# Patient Record
Sex: MALE | Race: BLACK OR AFRICAN AMERICAN | NOT HISPANIC OR LATINO | Employment: OTHER | URBAN - METROPOLITAN AREA
[De-identification: names, ages, dates, MRNs, and addresses within clinical notes are randomized per-mention and may not be internally consistent; named-entity substitution may affect disease eponyms.]

---

## 2019-03-12 ENCOUNTER — OFFICE VISIT (OUTPATIENT)
Dept: FAMILY MEDICINE | Facility: CLINIC | Age: 19
End: 2019-03-12
Payer: COMMERCIAL

## 2019-03-12 ENCOUNTER — LAB VISIT (OUTPATIENT)
Dept: LAB | Facility: HOSPITAL | Age: 19
End: 2019-03-12
Payer: COMMERCIAL

## 2019-03-12 VITALS
HEART RATE: 117 BPM | OXYGEN SATURATION: 94 % | WEIGHT: 136.56 LBS | HEIGHT: 71 IN | DIASTOLIC BLOOD PRESSURE: 85 MMHG | SYSTOLIC BLOOD PRESSURE: 121 MMHG | TEMPERATURE: 98 F | BODY MASS INDEX: 19.12 KG/M2

## 2019-03-12 DIAGNOSIS — R80.9 PROTEINURIA, UNSPECIFIED TYPE: ICD-10-CM

## 2019-03-12 DIAGNOSIS — Z00.00 ROUTINE ADULT HEALTH MAINTENANCE: Primary | ICD-10-CM

## 2019-03-12 DIAGNOSIS — Z00.00 ROUTINE ADULT HEALTH MAINTENANCE: ICD-10-CM

## 2019-03-12 LAB
ALBUMIN SERPL BCP-MCNC: 4.9 G/DL
ALP SERPL-CCNC: 80 U/L
ALT SERPL W/O P-5'-P-CCNC: 14 U/L
ANION GAP SERPL CALC-SCNC: 12 MMOL/L
AST SERPL-CCNC: 28 U/L
BASOPHILS # BLD AUTO: 0.02 K/UL
BASOPHILS NFR BLD: 0.3 %
BILIRUB SERPL-MCNC: 2.1 MG/DL
BUN SERPL-MCNC: 15 MG/DL
CALCIUM SERPL-MCNC: 10.6 MG/DL
CHLORIDE SERPL-SCNC: 103 MMOL/L
CO2 SERPL-SCNC: 24 MMOL/L
CREAT SERPL-MCNC: 1 MG/DL
DIFFERENTIAL METHOD: NORMAL
EOSINOPHIL # BLD AUTO: 0.3 K/UL
EOSINOPHIL NFR BLD: 3.5 %
ERYTHROCYTE [DISTWIDTH] IN BLOOD BY AUTOMATED COUNT: 12.6 %
EST. GFR  (AFRICAN AMERICAN): >60 ML/MIN/1.73 M^2
EST. GFR  (NON AFRICAN AMERICAN): >60 ML/MIN/1.73 M^2
ESTIMATED AVG GLUCOSE: 100 MG/DL
GLUCOSE SERPL-MCNC: 72 MG/DL
HBA1C MFR BLD HPLC: 5.1 %
HCT VFR BLD AUTO: 44.7 %
HGB BLD-MCNC: 15.1 G/DL
IMM GRANULOCYTES # BLD AUTO: 0.01 K/UL
IMM GRANULOCYTES NFR BLD AUTO: 0.1 %
LYMPHOCYTES # BLD AUTO: 2.4 K/UL
LYMPHOCYTES NFR BLD: 33.4 %
MCH RBC QN AUTO: 30.4 PG
MCHC RBC AUTO-ENTMCNC: 33.8 G/DL
MCV RBC AUTO: 90 FL
MONOCYTES # BLD AUTO: 0.5 K/UL
MONOCYTES NFR BLD: 6.7 %
NEUTROPHILS # BLD AUTO: 4 K/UL
NEUTROPHILS NFR BLD: 56 %
NRBC BLD-RTO: 0 /100 WBC
PLATELET # BLD AUTO: 278 K/UL
PMV BLD AUTO: 10 FL
POTASSIUM SERPL-SCNC: 3.6 MMOL/L
PROT SERPL-MCNC: 8.7 G/DL
RBC # BLD AUTO: 4.97 M/UL
SODIUM SERPL-SCNC: 139 MMOL/L
TSH SERPL DL<=0.005 MIU/L-ACNC: 0.88 UIU/ML
WBC # BLD AUTO: 7.12 K/UL

## 2019-03-12 PROCEDURE — 36415 COLL VENOUS BLD VENIPUNCTURE: CPT | Mod: PO

## 2019-03-12 PROCEDURE — 83036 HEMOGLOBIN GLYCOSYLATED A1C: CPT

## 2019-03-12 PROCEDURE — 99385 PREV VISIT NEW AGE 18-39: CPT | Mod: S$GLB,,, | Performed by: INTERNAL MEDICINE

## 2019-03-12 PROCEDURE — 99385 PR PREVENTIVE VISIT,NEW,18-39: ICD-10-PCS | Mod: S$GLB,,, | Performed by: INTERNAL MEDICINE

## 2019-03-12 PROCEDURE — 84443 ASSAY THYROID STIM HORMONE: CPT

## 2019-03-12 PROCEDURE — 99999 PR PBB SHADOW E&M-NEW PATIENT-LVL III: ICD-10-PCS | Mod: PBBFAC,,, | Performed by: INTERNAL MEDICINE

## 2019-03-12 PROCEDURE — 99999 PR PBB SHADOW E&M-NEW PATIENT-LVL III: CPT | Mod: PBBFAC,,, | Performed by: INTERNAL MEDICINE

## 2019-03-12 PROCEDURE — 86038 ANTINUCLEAR ANTIBODIES: CPT

## 2019-03-12 PROCEDURE — 85025 COMPLETE CBC W/AUTO DIFF WBC: CPT

## 2019-03-12 PROCEDURE — 86431 RHEUMATOID FACTOR QUANT: CPT

## 2019-03-12 PROCEDURE — 80053 COMPREHEN METABOLIC PANEL: CPT

## 2019-03-12 NOTE — PROGRESS NOTES
Subjective:       Patient ID: Corinjmichael Henderson X is a 18 y.o. male.    Chief Complaint: Establish Care    -est care------------------had urine drug screen for work and found to have protein in urine and told to f/u with doctor------------he has no known medical conditions and no new symptoms today-----      No past medical history on file.  No past surgical history on file.  Family History   Problem Relation Age of Onset    Hypertension Mother      Social History     Socioeconomic History    Marital status: Single     Spouse name: Not on file    Number of children: Not on file    Years of education: Not on file    Highest education level: Not on file   Social Needs    Financial resource strain: Not on file    Food insecurity - worry: Not on file    Food insecurity - inability: Not on file    Transportation needs - medical: Not on file    Transportation needs - non-medical: Not on file   Occupational History    Not on file   Tobacco Use    Smoking status: Current Some Day Smoker     Years: 0.50   Substance and Sexual Activity    Alcohol use: Yes     Alcohol/week: 1.2 oz     Types: 2 Shots of liquor per week     Frequency: Monthly or less    Drug use: Yes     Types: Marijuana     Comment: every day     Sexual activity: Not on file   Other Topics Concern    Not on file   Social History Narrative    Not on file     Review of Systems   Constitutional: Negative for activity change, appetite change, chills, diaphoresis, fatigue, fever and unexpected weight change.   HENT: Negative for drooling, ear discharge, ear pain, facial swelling, hearing loss, mouth sores, nosebleeds, postnasal drip, rhinorrhea, sinus pressure, sneezing, sore throat, tinnitus, trouble swallowing and voice change.    Eyes: Negative for photophobia, redness and visual disturbance.   Respiratory: Negative for apnea, cough, choking, chest tightness, shortness of breath and wheezing.    Cardiovascular: Negative for chest pain, palpitations  and leg swelling.   Gastrointestinal: Negative for abdominal distention, abdominal pain, anal bleeding, blood in stool, constipation, diarrhea, nausea and vomiting.   Endocrine: Negative for cold intolerance, heat intolerance, polydipsia, polyphagia and polyuria.   Genitourinary: Negative for difficulty urinating, dysuria, enuresis, flank pain, frequency, genital sores, hematuria and urgency.   Musculoskeletal: Negative for arthralgias, back pain, gait problem, joint swelling, myalgias, neck pain and neck stiffness.   Skin: Negative for color change, pallor, rash and wound.   Allergic/Immunologic: Negative for food allergies and immunocompromised state.   Neurological: Negative for dizziness, tremors, seizures, syncope, facial asymmetry, speech difficulty, weakness, light-headedness, numbness and headaches.   Hematological: Negative for adenopathy. Does not bruise/bleed easily.   Psychiatric/Behavioral: Negative for agitation, behavioral problems, confusion, decreased concentration, dysphoric mood, hallucinations, self-injury, sleep disturbance and suicidal ideas. The patient is not nervous/anxious and is not hyperactive.        Objective:      Physical Exam   Constitutional: He is oriented to person, place, and time. He appears well-developed and well-nourished. No distress.   HENT:   Head: Normocephalic and atraumatic.   Eyes: Pupils are equal, round, and reactive to light. No scleral icterus.   Neck: Normal range of motion. Neck supple. No JVD present. Carotid bruit is not present. No tracheal deviation present. No thyromegaly present.   Cardiovascular: Normal rate, regular rhythm, normal heart sounds and intact distal pulses.   Pulmonary/Chest: Effort normal and breath sounds normal. No respiratory distress. He has no wheezes. He has no rales. He exhibits no tenderness.   Abdominal: Soft. Bowel sounds are normal. He exhibits no distension. There is no tenderness. There is no rebound and no guarding.    Musculoskeletal: Normal range of motion. He exhibits no edema or tenderness.   Lymphadenopathy:     He has no cervical adenopathy.   Neurological: He is alert and oriented to person, place, and time.   Skin: Skin is warm and dry. No rash noted. He is not diaphoretic. No erythema. No pallor.   Psychiatric: He has a normal mood and affect. His behavior is normal. Judgment and thought content normal.   Nursing note and vitals reviewed.      CMP  No results found for: NA, K, CL, CO2, GLU, BUN, CREATININE, CALCIUM, PROT, ALBUMIN, BILITOT, ALKPHOS, AST, ALT, ANIONGAP, ESTGFRAFRICA, EGFRNONAA  No results found for: WBC, HGB, HCT, MCV, PLT  No results found for: CHOL  No results found for: HDL  No results found for: LDLCALC  No results found for: TRIG  No results found for: CHOLHDL  No results found for: TSH  No results found for: LABA1C, HGBA1C  Assessment:       1. Routine adult health maintenance    2. Proteinuria, unspecified type        Plan:   Routine adult health maintenance  -     Comprehensive metabolic panel; Future; Expected date: 03/12/2019  -     CBC auto differential; Future; Expected date: 03/12/2019  -     TSH; Future; Expected date: 03/12/2019  -     Hemoglobin A1c; Future; Expected date: 03/12/2019    Proteinuria, unspecified type  -     Urinalysis; Future; Expected date: 03/12/2019  -     Hemoglobin A1c; Future; Expected date: 03/12/2019  -     SHAHID Screen w/Reflex; Future; Expected date: 03/12/2019  -     Rheumatoid factor; Future; Expected date: 03/12/2019    F/u 1 month./

## 2019-03-13 ENCOUNTER — TELEPHONE (OUTPATIENT)
Dept: FAMILY MEDICINE | Facility: CLINIC | Age: 19
End: 2019-03-13

## 2019-03-13 DIAGNOSIS — R80.9 PROTEINURIA, UNSPECIFIED TYPE: Primary | ICD-10-CM

## 2019-03-13 LAB
ANA SER QL IF: NORMAL
RHEUMATOID FACT SERPL-ACNC: <10 IU/ML

## 2019-03-14 ENCOUNTER — LAB VISIT (OUTPATIENT)
Dept: LAB | Facility: HOSPITAL | Age: 19
End: 2019-03-14
Attending: INTERNAL MEDICINE
Payer: COMMERCIAL

## 2019-03-14 ENCOUNTER — OFFICE VISIT (OUTPATIENT)
Dept: NEPHROLOGY | Facility: CLINIC | Age: 19
End: 2019-03-14
Payer: COMMERCIAL

## 2019-03-14 VITALS
HEART RATE: 62 BPM | WEIGHT: 139.13 LBS | DIASTOLIC BLOOD PRESSURE: 68 MMHG | BODY MASS INDEX: 19.48 KG/M2 | HEIGHT: 71 IN | SYSTOLIC BLOOD PRESSURE: 108 MMHG

## 2019-03-14 DIAGNOSIS — E80.6 HYPERBILIRUBINEMIA: ICD-10-CM

## 2019-03-14 DIAGNOSIS — R80.9 PROTEINURIA, UNSPECIFIED TYPE: ICD-10-CM

## 2019-03-14 DIAGNOSIS — Z71.89 ENCOUNTER FOR MEDICATION REVIEW AND COUNSELING: ICD-10-CM

## 2019-03-14 DIAGNOSIS — R80.9 PROTEINURIA, UNSPECIFIED TYPE: Primary | ICD-10-CM

## 2019-03-14 LAB
ALBUMIN SERPL BCP-MCNC: 4.5 G/DL
ALP SERPL-CCNC: 79 U/L
ALT SERPL W/O P-5'-P-CCNC: 12 U/L
AST SERPL-CCNC: 24 U/L
BILIRUB DIRECT SERPL-MCNC: 0.6 MG/DL
BILIRUB SERPL-MCNC: 1.7 MG/DL
C3 SERPL-MCNC: 115 MG/DL
C4 SERPL-MCNC: 23 MG/DL
PROT SERPL-MCNC: 8.1 G/DL

## 2019-03-14 PROCEDURE — 86803 HEPATITIS C AB TEST: CPT

## 2019-03-14 PROCEDURE — 3008F BODY MASS INDEX DOCD: CPT | Mod: CPTII,S$GLB,, | Performed by: INTERNAL MEDICINE

## 2019-03-14 PROCEDURE — 80076 HEPATIC FUNCTION PANEL: CPT

## 2019-03-14 PROCEDURE — 86038 ANTINUCLEAR ANTIBODIES: CPT

## 2019-03-14 PROCEDURE — 87340 HEPATITIS B SURFACE AG IA: CPT

## 2019-03-14 PROCEDURE — 99999 PR PBB SHADOW E&M-EST. PATIENT-LVL III: CPT | Mod: PBBFAC,,, | Performed by: INTERNAL MEDICINE

## 2019-03-14 PROCEDURE — 84165 PROTEIN E-PHORESIS SERUM: CPT

## 2019-03-14 PROCEDURE — 86160 COMPLEMENT ANTIGEN: CPT | Mod: 59

## 2019-03-14 PROCEDURE — 36415 COLL VENOUS BLD VENIPUNCTURE: CPT

## 2019-03-14 PROCEDURE — 99205 PR OFFICE/OUTPT VISIT, NEW, LEVL V, 60-74 MIN: ICD-10-PCS | Mod: S$GLB,,, | Performed by: INTERNAL MEDICINE

## 2019-03-14 PROCEDURE — 86160 COMPLEMENT ANTIGEN: CPT

## 2019-03-14 PROCEDURE — 86706 HEP B SURFACE ANTIBODY: CPT

## 2019-03-14 PROCEDURE — 84165 PROTEIN E-PHORESIS SERUM: CPT | Mod: 26,,, | Performed by: PATHOLOGY

## 2019-03-14 PROCEDURE — 84165 PATHOLOGIST INTERPRETATION SPE: ICD-10-PCS | Mod: 26,,, | Performed by: PATHOLOGY

## 2019-03-14 PROCEDURE — 99205 OFFICE O/P NEW HI 60 MIN: CPT | Mod: S$GLB,,, | Performed by: INTERNAL MEDICINE

## 2019-03-14 PROCEDURE — 99999 PR PBB SHADOW E&M-EST. PATIENT-LVL III: ICD-10-PCS | Mod: PBBFAC,,, | Performed by: INTERNAL MEDICINE

## 2019-03-14 PROCEDURE — 86704 HEP B CORE ANTIBODY TOTAL: CPT

## 2019-03-14 PROCEDURE — 3008F PR BODY MASS INDEX (BMI) DOCUMENTED: ICD-10-PCS | Mod: CPTII,S$GLB,, | Performed by: INTERNAL MEDICINE

## 2019-03-14 NOTE — LETTER
March 14, 2019      Wolf Aleman MD  [address] [place] [address]           [facility]  [address]  [place] [address]  Phone: [phone]  Fax: [phone]          Patient: Farhat Henderson X   MR Number: [id number]   YOB: 2000   Date of Visit: 3/14/2019       Dear Dr. Wolf Aleman:    Thank you for referring Farhat Henderson X to me for evaluation. Attached you will find relevant portions of my assessment and plan of care.    If you have questions, please do not hesitate to call me. I look forward to following Farhat Henderson X along with you.    Sincerely,    Gia Yin MD    Enclosure  CC:  No Recipients    If you would like to receive this communication electronically, please contact [email or web address] or [phone] to request more information on [facility] Link access.    For providers and/or their staff who would like to refer a patient to [facility], please contact us through our one-stop-shop provider referral line, [facility], at [phone].    If you feel you have received this communication in error or would no longer like to receive these types of communications, please e-mail [email or web address]

## 2019-03-14 NOTE — PROGRESS NOTES
"Farhat Henderson X is a 18 y.o. male for whom nephrology consult has been requested to evaluate and give opinion.   Date of consult: 3/14/19  Reason for consult: proteinuria  Referring physician: Dr. Wolf Aleman    HPI: thank you for referring pt to us. As you know pt is a 17 y/o AA male who lives in [place], and is currently in [place] for work. Pt was incidently found to have proteinuria as part of pre-employment labs in [place] in Feb 2019. He was asked to see a doctor. He was about to move to  for work, and decided to come to [facility] for above reason. [place] [facility] [place]. Pt has no active c/o's, no sx's, no discomfort, has never seen blood in his urine, no blood in sputum, no urinary discomfort, no back pain, no abd pain, no rash, no fever, no recent infections, no heavy sports. Currently feels well. He does not recall being sick as a child, no leg puffiness now or before, no SOB.    PAST MEDICAL HISTORY:  He  has no past medical history on file.    PAST SURGICAL HISTORY:  He  has no past surgical history on file.    SOCIAL HISTORY:  He  reports that he has been smoking.  He has smoked for the past 0.50 years. he has never used smokeless tobacco. He reports that he drinks about 1.2 oz of alcohol per week. He reports that he uses drugs. Drug: Marijuana. Denies IV drug use, denies risk factors for viral hepatitis and for HIV    FAMILY MEDICAL HISTORY:  His family history includes Hypertension in his mother.    Review of patient's allergies indicates:  No Known Allergies        Prior to Admission medications    Not on File        REVIEW OF SYSTEMS:  Patient has no fever, fatigue, visual changes, chest pain, edema, cough, dyspnea, nausea, vomiting, constipation, diarrhea, arthralgias, pruritis, dizziness, weakness, depression, confusion.    [unfilled]    PHYSICAL EXAM:   height is 5' 11" (1.803 m) and weight is 63.1 kg (139 lb 1.8 oz). His blood pressure is 108/68 and his " pulse is 62.   Gen: WDWN male in no apparent distress  Psych: Normal mood and affect  Skin: No rashes or ulcers  Eyes: Normal conjunctiva and lids, PERRLA  ENT: Normal hearing with no oropharyngeal lesions  Neck: No JVD  Chest: Clear with no rales, rhonchi, wheezing with normal effort  CV: Regular with no murmurs, gallops or rubs  Abd: Soft, nontender, no distension, positive bowel sounds  Ext: No cyanosis, clubbing or edema    Labs: reviewed:  BMP  Lab Results   Component Value Date     03/12/2019    K 3.6 03/12/2019     03/12/2019    CO2 24 03/12/2019    BUN 15 03/12/2019    CREATININE 1.0 03/12/2019    CALCIUM 10.6 (H) 03/12/2019    ANIONGAP 12 03/12/2019    ESTGFRAFRICA >60.0 03/12/2019    EGFRNONAA >60.0 03/12/2019     Lab Results   Component Value Date    WBC 7.12 03/12/2019    HGB 15.1 03/12/2019    HCT 44.7 03/12/2019    MCV 90 03/12/2019     03/12/2019     U/a: 1+ protein, no blood, no RBC's, microscopy was negative  RF <10   C3 115  C4 23  Noted total bilirubin 1.7, direct bili 0.6    IMPRESSION AND RECOMMENDATIONS:  19 y/o AA male with proteinuria on urinary dip stick obtained for pre-employment reasons:  The impression is:    1. Renal: proteinuria. Reason unclear at this point  Young individual  Normotensive  Normal s Cr, normal renal function  DDX: Children could have nephrotic syndrome caused by minimal change disease (DADA). Pt is a young adult. DADA needs to be considered  Uses drugs, tobacco.   Noted incidentally has elevated bilirubin (conjugated). Any liver disease? Any risk for HIV?  Any reasons to suspect viral hepatitis? (uses drugs after all, high risk)  Normal complements C3 and C4   Normal RF  No hematuria  O/w u/a was bland. Microscopy negative  FSGS in an AA pt was considered, would have also has hematuria.    2. Hyperbilirubinemia. As noted and addressed above  Normal transaminases  Will likely need further hepatology/GI evaluation      Plans and recommendations:  As  discussed above  Discussed with pt, opportunity for questions provided  24 hour urine to quantify proteinuria  Hepatitis B and C serologies  Discussed HIV screening with pt, declined  Advised pt to quit smoking and using drugs  RTC 1 week  Total time spent 60 minutes including time needed to review the records, the   patient evaluation, documentation, face-to-face discussion with the patient,   more than 50% of the time was spent on coordination of care and counseling.    Level V visit.    Gia Yin MD

## 2019-03-15 LAB
ALBUMIN SERPL ELPH-MCNC: 4.75 G/DL
ALPHA1 GLOB SERPL ELPH-MCNC: 0.29 G/DL
ALPHA2 GLOB SERPL ELPH-MCNC: 0.89 G/DL
ANA SER QL IF: NORMAL
B-GLOBULIN SERPL ELPH-MCNC: 0.84 G/DL
GAMMA GLOB SERPL ELPH-MCNC: 1.23 G/DL
HBV CORE AB SERPL QL IA: NEGATIVE
HBV SURFACE AB SER-ACNC: NEGATIVE M[IU]/ML
HBV SURFACE AG SERPL QL IA: NEGATIVE
HCV AB SERPL QL IA: NEGATIVE
PATHOLOGIST INTERPRETATION SPE: NORMAL
PROT SERPL-MCNC: 8 G/DL

## 2019-03-18 ENCOUNTER — LAB VISIT (OUTPATIENT)
Dept: LAB | Facility: HOSPITAL | Age: 19
End: 2019-03-18
Attending: INTERNAL MEDICINE
Payer: COMMERCIAL

## 2019-03-18 DIAGNOSIS — R80.9 PROTEINURIA, UNSPECIFIED TYPE: ICD-10-CM

## 2019-03-18 PROCEDURE — 84156 ASSAY OF PROTEIN URINE: CPT

## 2019-03-19 LAB
PROT 24H UR-MRATE: 104 MG/SPEC
PROT UR-MCNC: 8 MG/DL
URINE COLLECTION DURATION: 24 HR
URINE VOLUME: 1300 ML

## 2019-03-21 ENCOUNTER — OFFICE VISIT (OUTPATIENT)
Dept: NEPHROLOGY | Facility: CLINIC | Age: 19
End: 2019-03-21
Payer: COMMERCIAL

## 2019-03-21 VITALS
WEIGHT: 142.44 LBS | HEART RATE: 64 BPM | BODY MASS INDEX: 20.39 KG/M2 | HEIGHT: 70 IN | SYSTOLIC BLOOD PRESSURE: 100 MMHG | DIASTOLIC BLOOD PRESSURE: 60 MMHG

## 2019-03-21 DIAGNOSIS — Z71.89 ENCOUNTER FOR MEDICATION REVIEW AND COUNSELING: ICD-10-CM

## 2019-03-21 DIAGNOSIS — R80.9 MICROALBUMINURIA: ICD-10-CM

## 2019-03-21 DIAGNOSIS — E80.6 HYPERBILIRUBINEMIA: ICD-10-CM

## 2019-03-21 DIAGNOSIS — R80.9 PROTEINURIA, UNSPECIFIED TYPE: Primary | ICD-10-CM

## 2019-03-21 PROCEDURE — 3008F BODY MASS INDEX DOCD: CPT | Mod: CPTII,S$GLB,, | Performed by: INTERNAL MEDICINE

## 2019-03-21 PROCEDURE — 99999 PR PBB SHADOW E&M-EST. PATIENT-LVL III: CPT | Mod: PBBFAC,,, | Performed by: INTERNAL MEDICINE

## 2019-03-21 PROCEDURE — 99215 PR OFFICE/OUTPT VISIT, EST, LEVL V, 40-54 MIN: ICD-10-PCS | Mod: S$GLB,,, | Performed by: INTERNAL MEDICINE

## 2019-03-21 PROCEDURE — 99999 PR PBB SHADOW E&M-EST. PATIENT-LVL III: ICD-10-PCS | Mod: PBBFAC,,, | Performed by: INTERNAL MEDICINE

## 2019-03-21 PROCEDURE — 99215 OFFICE O/P EST HI 40 MIN: CPT | Mod: S$GLB,,, | Performed by: INTERNAL MEDICINE

## 2019-03-21 PROCEDURE — 3008F PR BODY MASS INDEX (BMI) DOCUMENTED: ICD-10-PCS | Mod: CPTII,S$GLB,, | Performed by: INTERNAL MEDICINE

## 2019-03-21 NOTE — PROGRESS NOTES
"Renal clinic f/u note:  Date of clinic visit: 3/21/19  Reason for consult: proteinuria  Referring physician: Dr. Wolf Aleman     HPI: Pt is a 19 y/o AA male who presents for f/u of proteinuria after w/u was ordered. He was initially seen by us about 1 week ago. Labs, meds, and w/u obtained were discussed with pt and his cousin, with his permisssion. Pt has no active c/o's, no sx's, no discomfort, has never seen blood in his urine, no blood in sputum, no urinary discomfort, no back pain, no abd pain, no rash, no fever, no recent infections, no heavy sports. Currently feels well. He does not recall being sick as a child, no leg puffiness now or before, no SOB.    Noted also that he has mild increase in conjugated bilirubin. He has no abd pain, though he says that sometimes his "ribs hurt on the right side." No n/v, no fever, no change in BM, no h/o of alcohol intake. No pain in RUQ after eating fatty meals.     PAST MEDICAL HISTORY:  He  has no past medical history on file.     PAST SURGICAL HISTORY:  He  has no past surgical history on file.     SOCIAL HISTORY:  He  reports that he has been smoking.  He has smoked for the past 0.50 years. he has never used smokeless tobacco. He reports that he drinks about 1.2 oz of alcohol per week. He reports that he uses drugs. Drug: Marijuana. Denies IV drug use, denies risk factors for viral hepatitis and for HIV     FAMILY MEDICAL HISTORY:  His family history includes Hypertension in his mother.     Review of patient's allergies indicates:  No Known Allergies           Prior to Admission medications    Not on File         REVIEW OF SYSTEMS:  Patient has no fever, fatigue, visual changes, chest pain, edema, cough, dyspnea, nausea, vomiting, constipation, diarrhea, arthralgias, pruritis, dizziness, weakness, depression, confusion.     [unfilled]     PHYSICAL EXAM:  Blood pressure 100/60, pulse 64, height 5' 10" (1.778 m), weight 64.6 kg (142 lb 6.7 oz).  Gen:    WDWN male " in no apparent distress  Psych: Normal mood and affect  Skin:    No rashes or ulcers, noted mild jaundice  Eyes:   mild jaundice conjunctiva, PERRLA  ENT:    Normal hearing with no oropharyngeal lesions  Neck:   No JVD  Chest:  Clear with no rales, rhonchi, wheezing with normal effort  CV:      Regular with no murmurs, gallops or rubs  Abd:     Soft, nontender, no distension, positive bowel sounds  Ext:      No cyanosis, clubbing or edema     Labs: reviewed:  BMP  Lab Results   Component Value Date     03/12/2019    K 3.6 03/12/2019     03/12/2019    CO2 24 03/12/2019    BUN 15 03/12/2019    CREATININE 1.0 03/12/2019    CALCIUM 10.6 (H) 03/12/2019    ANIONGAP 12 03/12/2019    ESTGFRAFRICA >60.0 03/12/2019    EGFRNONAA >60.0 03/12/2019     Lab Results   Component Value Date    WBC 7.12 03/12/2019    HGB 15.1 03/12/2019    HCT 44.7 03/12/2019    MCV 90 03/12/2019     03/12/2019     24 hour urine shows 104 mg proteinuria  Hep B serologies neg  Hep C neg    Previously:  U/a: 1+ protein, no blood, no RBC's, microscopy was negative  RF <10   C3 115  C4 23  Noted total bilirubin 1.7, direct bili 0.6     IMPRESSION AND RECOMMENDATIONS:  19 y/o AA male with proteinuria on urinary dip stick obtained for pre-employment reasons:  The impression is:     1. Renal: proteinuria. Reason remains unclear  Has very small amounts of proteinuria: hector  BP not high  No hematuria  Hepatitis B neg  Hepatitis C neg  RF neg  SPEP neg  C3 and C4 complements normal  The renal presentation is benign  Will monitor and f/u closely, will repeat labs      To review:  DDX: Children could have nephrotic syndrome caused by minimal change disease (DADA). Pt is a young adult. DADA needs to be considered  Uses drugs, tobacco. Is it possible that this pt had DADA as a child, and now we are seeing the tail end of it as mild proteinuria?  FSGS in an AA pt was considered, would have also has hematuria.     2. Hyperbilirubinemia. Increased  conjugated  Normal transaminases  Will likely need further hepatology/GI evaluation  Will refer    3. Smokes and uses marijuana  Advised pt to quit smoking and using drugs        Plans and recommendations:  As discussed above  Discussed with pt, opportunity for questions provided  Refer to GI  RTC 3 months  Total time spent 40 minutes including time needed to review the records, the   patient evaluation, documentation, face-to-face discussion with the patient,   more than 50% of the time was spent on coordination of care and counseling.    Level V visit.     Gia Yin MD

## 2019-03-22 ENCOUNTER — LAB VISIT (OUTPATIENT)
Dept: LAB | Facility: HOSPITAL | Age: 19
End: 2019-03-22
Attending: INTERNAL MEDICINE
Payer: COMMERCIAL

## 2019-03-22 ENCOUNTER — OFFICE VISIT (OUTPATIENT)
Dept: GASTROENTEROLOGY | Facility: CLINIC | Age: 19
End: 2019-03-22
Payer: COMMERCIAL

## 2019-03-22 VITALS
SYSTOLIC BLOOD PRESSURE: 112 MMHG | DIASTOLIC BLOOD PRESSURE: 64 MMHG | BODY MASS INDEX: 20.43 KG/M2 | WEIGHT: 142.44 LBS | HEART RATE: 62 BPM

## 2019-03-22 DIAGNOSIS — E80.6 HYPERBILIRUBINEMIA: Primary | ICD-10-CM

## 2019-03-22 DIAGNOSIS — E80.4 GILBERT'S SYNDROME: ICD-10-CM

## 2019-03-22 DIAGNOSIS — E80.6 HYPERBILIRUBINEMIA: ICD-10-CM

## 2019-03-22 LAB
A1AT SERPL-MCNC: 84 MG/DL
ALBUMIN SERPL BCP-MCNC: 4.4 G/DL
ALP SERPL-CCNC: 73 U/L
ALT SERPL W/O P-5'-P-CCNC: 12 U/L
AST SERPL-CCNC: 25 U/L
BILIRUB DIRECT SERPL-MCNC: 0.5 MG/DL
BILIRUB SERPL-MCNC: 1.2 MG/DL
CERULOPLASMIN SERPL-MCNC: 18 MG/DL
FERRITIN SERPL-MCNC: 47 NG/ML
IRON SERPL-MCNC: 80 UG/DL
PROT SERPL-MCNC: 8.2 G/DL
SATURATED IRON: 19 %
TOTAL IRON BINDING CAPACITY: 422 UG/DL
TRANSFERRIN SERPL-MCNC: 285 MG/DL

## 2019-03-22 PROCEDURE — 80076 HEPATIC FUNCTION PANEL: CPT

## 2019-03-22 PROCEDURE — 99203 PR OFFICE/OUTPT VISIT, NEW, LEVL III, 30-44 MIN: ICD-10-PCS | Mod: S$GLB,,, | Performed by: INTERNAL MEDICINE

## 2019-03-22 PROCEDURE — 3008F BODY MASS INDEX DOCD: CPT | Mod: CPTII,S$GLB,, | Performed by: INTERNAL MEDICINE

## 2019-03-22 PROCEDURE — 99203 OFFICE O/P NEW LOW 30 MIN: CPT | Mod: S$GLB,,, | Performed by: INTERNAL MEDICINE

## 2019-03-22 PROCEDURE — 99999 PR PBB SHADOW E&M-EST. PATIENT-LVL III: ICD-10-PCS | Mod: PBBFAC,,, | Performed by: INTERNAL MEDICINE

## 2019-03-22 PROCEDURE — 82728 ASSAY OF FERRITIN: CPT

## 2019-03-22 PROCEDURE — 86235 NUCLEAR ANTIGEN ANTIBODY: CPT

## 2019-03-22 PROCEDURE — 3008F PR BODY MASS INDEX (BMI) DOCUMENTED: ICD-10-PCS | Mod: CPTII,S$GLB,, | Performed by: INTERNAL MEDICINE

## 2019-03-22 PROCEDURE — 99999 PR PBB SHADOW E&M-EST. PATIENT-LVL III: CPT | Mod: PBBFAC,,, | Performed by: INTERNAL MEDICINE

## 2019-03-22 PROCEDURE — 36415 COLL VENOUS BLD VENIPUNCTURE: CPT

## 2019-03-22 PROCEDURE — 86790 VIRUS ANTIBODY NOS: CPT

## 2019-03-22 PROCEDURE — 83540 ASSAY OF IRON: CPT

## 2019-03-22 PROCEDURE — 82103 ALPHA-1-ANTITRYPSIN TOTAL: CPT

## 2019-03-22 PROCEDURE — 82390 ASSAY OF CERULOPLASMIN: CPT

## 2019-03-22 NOTE — LETTER
March 22, 2019      Gia Yin MD  [address]  [place] [address]           [facility]  [address] [facility] [address]  Phone: [phone]  Fax: [phone]          Patient: Farhat Henderson X   MR Number: [id number]   YOB: 2000   Date of Visit: 3/22/2019       Dear Dr. Gia Yin:    Thank you for referring Farhat Henderson X to me for evaluation. Attached you will find relevant portions of my assessment and plan of care.    If you have questions, please do not hesitate to call me. I look forward to following Farhat Henderson X along with you.    Sincerely,    Ashley Castaneda MD    Enclosure  CC:  No Recipients    If you would like to receive this communication electronically, please contact [email or web address] or [phone] to request more information on [facility] Link access.    For providers and/or their staff who would like to refer a patient to [facility], please contact us through our one-stop-shop provider referral line, [facility], at [phone].    If you feel you have received this communication in error or would no longer like to receive these types of communications, please e-mail [email or web address]

## 2019-03-22 NOTE — PROGRESS NOTES
Subjective:     Farhat Henderson X is here for evaluation of Elevated Hepatic Enzymes      HPI  Farhat Henderson X here for evaluation of hyperbilirubinemia.  Overall feels well on reports occasional left lower quadrant abdominal cramping.  Denies any previous known history of liver disease. No family history of liver disease. No acute issues.  Only his bilirubin is elevated.  His other liver tests are normal.    Review of Systems   Constitutional: Negative.    HENT: Negative.    Eyes: Negative.    Respiratory: Negative.    Cardiovascular: Negative.    Gastrointestinal: Positive for abdominal pain.   Genitourinary: Negative.    Musculoskeletal: Negative.    Skin: Negative.    Neurological: Negative.    Psychiatric/Behavioral: Negative.        Objective:     Physical Exam   Constitutional: He is oriented to person, place, and time. He appears well-developed and well-nourished. No distress.   HENT:   Head: Normocephalic and atraumatic.   Mouth/Throat: Oropharynx is clear and moist. No oropharyngeal exudate.   Eyes: Conjunctivae are normal. Pupils are equal, round, and reactive to light. Right eye exhibits no discharge. Left eye exhibits no discharge. No scleral icterus.   Pulmonary/Chest: Effort normal and breath sounds normal. No respiratory distress. He has no wheezes.   Abdominal: Soft. He exhibits no distension. There is no tenderness.   Musculoskeletal: He exhibits no edema.   Neurological: He is alert and oriented to person, place, and time.   Psychiatric: He has a normal mood and affect. His behavior is normal.   Vitals reviewed.      Computed MELD-Na score unavailable. Necessary lab results were not found in the last year.  Computed MELD score unavailable. Necessary lab results were not found in the last year.    WBC   Date Value Ref Range Status   03/12/2019 7.12 3.90 - 12.70 K/uL Final     Hemoglobin   Date Value Ref Range Status   03/12/2019 15.1 14.0 - 18.0 g/dL Final     Hematocrit   Date Value Ref Range Status    03/12/2019 44.7 40.0 - 54.0 % Final     Platelets   Date Value Ref Range Status   03/12/2019 278 150 - 350 K/uL Final     BUN, Bld   Date Value Ref Range Status   03/12/2019 15 6 - 20 mg/dL Final     Creatinine   Date Value Ref Range Status   03/12/2019 1.0 0.5 - 1.4 mg/dL Final     Glucose   Date Value Ref Range Status   03/12/2019 72 70 - 110 mg/dL Final     Calcium   Date Value Ref Range Status   03/12/2019 10.6 (H) 8.7 - 10.5 mg/dL Final     Sodium   Date Value Ref Range Status   03/12/2019 139 136 - 145 mmol/L Final     Potassium   Date Value Ref Range Status   03/12/2019 3.6 3.5 - 5.1 mmol/L Final     Chloride   Date Value Ref Range Status   03/12/2019 103 95 - 110 mmol/L Final     AST   Date Value Ref Range Status   03/14/2019 24 10 - 40 U/L Final     ALT   Date Value Ref Range Status   03/14/2019 12 10 - 44 U/L Final     Alkaline Phosphatase   Date Value Ref Range Status   03/14/2019 79 55 - 135 U/L Final     Total Bilirubin   Date Value Ref Range Status   03/14/2019 1.7 (H) 0.1 - 1.0 mg/dL Final     Comment:     For infants and newborns, interpretation of results should be based  on gestational age, weight and in agreement with clinical  observations.  Premature Infant recommended reference ranges:  Up to 24 hours.............<8.0 mg/dL  Up to 48 hours............<12.0 mg/dL  3-5 days..................<15.0 mg/dL  6-29 days.................<15.0 mg/dL       Albumin   Date Value Ref Range Status   03/14/2019 4.5 3.5 - 5.2 g/dL Final     No results found for: INR      Assessment/Plan:     1. Hyperbilirubinemia    2. Gilbert's syndrome      Farhat Henderson X is a 18 y.o. male withElevated Hepatic Enzymes      Hyperbilirubinemia-only isolated hyperbilirubinemia.  No evidence of liver abnormalities.  Low concern for underlying liver disease that would lead to any chronic issue.  Suspect this is related to a benign hyperbilirubinemia.  -will eval for other causes as a process of exclusion  -check imaging to look  for anything that could be causing obstruction  -     Hepatic function panel; Future; Expected date: 03/22/2019  -     US Abdomen Limited; Future; Expected date: 03/22/2019  -     Antimitochondrial antibody; Future; Expected date: 03/22/2019  -     Iron and TIBC; Future; Expected date: 03/22/2019  -     Ferritin; Future; Expected date: 03/22/2019  -     Alpha-1-antitrypsin; Future; Expected date: 03/22/2019  -     Hepatitis A antibody, IgG; Future; Expected date: 03/22/2019  -     Ceruloplasmin; Future; Expected date: 03/22/2019    Gilbert's syndrome-most likely etiology of elevated bilirubin which is a benign process.  -discussion with patient regarding the above    Return to clinic      Ashley Castaneda MD

## 2019-03-25 DIAGNOSIS — E88.01 ALPHA-1-ANTITRYPSIN DEFICIENCY: Primary | ICD-10-CM

## 2019-03-25 LAB
HEPATITIS A ANTIBODY, IGG: POSITIVE
MITOCHONDRIA AB TITR SER IF: NORMAL {TITER}

## 2019-03-26 ENCOUNTER — TELEPHONE (OUTPATIENT)
Dept: RADIOLOGY | Facility: HOSPITAL | Age: 19
End: 2019-03-26

## 2019-03-27 ENCOUNTER — HOSPITAL ENCOUNTER (OUTPATIENT)
Dept: RADIOLOGY | Facility: HOSPITAL | Age: 19
Discharge: HOME | End: 2019-03-27
Attending: INTERNAL MEDICINE
Payer: COMMERCIAL

## 2019-03-27 DIAGNOSIS — E80.6 HYPERBILIRUBINEMIA: ICD-10-CM

## 2019-03-27 PROCEDURE — 76705 ECHO EXAM OF ABDOMEN: CPT | Mod: TC

## 2019-03-27 PROCEDURE — 76705 ECHO EXAM OF ABDOMEN: CPT | Mod: 26,,, | Performed by: RADIOLOGY

## 2019-03-27 PROCEDURE — 76705 US ABDOMEN LIMITED: ICD-10-PCS | Mod: 26,,, | Performed by: RADIOLOGY

## 2019-05-16 DIAGNOSIS — R80.9 PROTEINURIA, UNSPECIFIED TYPE: Primary | ICD-10-CM

## 2019-05-26 ENCOUNTER — HOSPITAL ENCOUNTER (EMERGENCY)
Facility: HOSPITAL | Age: 19
Discharge: HOME | End: 2019-05-26
Attending: EMERGENCY MEDICINE
Payer: COMMERCIAL

## 2019-05-26 VITALS
OXYGEN SATURATION: 99 % | SYSTOLIC BLOOD PRESSURE: 135 MMHG | TEMPERATURE: 97.6 F | HEART RATE: 54 BPM | RESPIRATION RATE: 18 BRPM | DIASTOLIC BLOOD PRESSURE: 86 MMHG

## 2019-05-26 DIAGNOSIS — L02.91 ABSCESS: Primary | ICD-10-CM

## 2019-05-26 PROCEDURE — 0J9D0ZZ DRAINAGE OF RIGHT UPPER ARM SUBCUTANEOUS TISSUE AND FASCIA, OPEN APPROACH: ICD-10-PCS | Performed by: EMERGENCY MEDICINE

## 2019-05-26 PROCEDURE — 99283 EMERGENCY DEPT VISIT LOW MDM: CPT | Mod: 25

## 2019-05-26 PROCEDURE — 10060 I&D ABSCESS SIMPLE/SINGLE: CPT | Performed by: PHYSICIAN ASSISTANT

## 2019-05-26 PROCEDURE — 63700000 HC SELF-ADMINISTRABLE DRUG: Performed by: PHYSICIAN ASSISTANT

## 2019-05-26 RX ORDER — SULFAMETHOXAZOLE AND TRIMETHOPRIM 800; 160 MG/1; MG/1
1 TABLET ORAL 2 TIMES DAILY
Qty: 14 TABLET | Refills: 0 | Status: SHIPPED | OUTPATIENT
Start: 2019-05-26 | End: 2019-06-02

## 2019-05-26 RX ORDER — IBUPROFEN 600 MG/1
600 TABLET ORAL ONCE
Status: COMPLETED | OUTPATIENT
Start: 2019-05-26 | End: 2019-05-26

## 2019-05-26 RX ORDER — IBUPROFEN 600 MG/1
600 TABLET ORAL EVERY 6 HOURS PRN
Qty: 25 TABLET | Refills: 0 | Status: SHIPPED | OUTPATIENT
Start: 2019-05-26 | End: 2019-06-05

## 2019-05-26 RX ORDER — SULFAMETHOXAZOLE AND TRIMETHOPRIM 800; 160 MG/1; MG/1
1 TABLET ORAL ONCE
Status: COMPLETED | OUTPATIENT
Start: 2019-05-26 | End: 2019-05-26

## 2019-05-26 RX ORDER — LIDOCAINE HYDROCHLORIDE AND EPINEPHRINE 10; 10 UG/ML; MG/ML
20 INJECTION, SOLUTION INFILTRATION; PERINEURAL ONCE
Status: DISCONTINUED | OUTPATIENT
Start: 2019-05-26 | End: 2019-05-26 | Stop reason: HOSPADM

## 2019-05-26 RX ADMIN — SULFAMETHOXAZOLE AND TRIMETHOPRIM 1 TABLET: 800; 160 TABLET ORAL at 16:14

## 2019-05-26 RX ADMIN — IBUPROFEN 600 MG: 600 TABLET ORAL at 16:14

## 2019-05-26 ASSESSMENT — ENCOUNTER SYMPTOMS
SHORTNESS OF BREATH: 0
COUGH: 0
CHILLS: 0
NUMBNESS: 0
FEVER: 0
VOMITING: 0
WEAKNESS: 0
DIAPHORESIS: 0
COLOR CHANGE: 0
ABDOMINAL PAIN: 0
FATIGUE: 0
SORE THROAT: 0
DIARRHEA: 0

## 2019-05-26 NOTE — ED PROVIDER NOTES
HPI     Chief Complaint   Patient presents with   • Abscess     Pt w/ small abscess to RUE. Reports he has had it for 2 months and recently increased in size and has had more pain. Denies fevers.       Is a 18-year-old otherwise healthy man presenting for evaluation of a swollen tender area on his time, the past 3 or 4 days it is become significantly more swollen and tender.  He is also become quite red with a patch extending from his elbow to his shoulder.  He denies any fevers or chills and has no other complaints or concerns today.  He reports moderate pain is described as a achy burning sensation that does not radiate.             Patient History     No past medical history on file.    No past surgical history on file.    No family history on file.    Social History   Substance Use Topics   • Smoking status: Not on file   • Smokeless tobacco: Not on file   • Alcohol use Not on file       Systems Reviewed from Nursing Triage:          Review of Systems     Review of Systems   Constitutional: Negative for chills, diaphoresis, fatigue and fever.   HENT: Negative for sore throat.    Respiratory: Negative for cough and shortness of breath.    Cardiovascular: Negative for chest pain.   Gastrointestinal: Negative for abdominal pain, diarrhea and vomiting.   Skin: Positive for rash. Negative for color change.   Neurological: Negative for weakness and numbness.        Physical Exam     ED Triage Vitals [05/26/19 1335]   Temp Heart Rate Resp BP SpO2   36.4 °C (97.6 °F) 88 18 137/79 99 %      Temp Source Heart Rate Source Patient Position BP Location FiO2 (%) (Set)   Oral -- -- -- --                     Patient Vitals for the past 24 hrs:   BP Temp Temp src Pulse Resp SpO2   05/26/19 1556 135/86 - - (!) 54 18 -   05/26/19 1335 137/79 36.4 °C (97.6 °F) Oral 88 18 99 %           Physical Exam   Constitutional: He appears well-developed and well-nourished.   HENT:   Head: Normocephalic and atraumatic.   Nose: Nose normal.    Cardiovascular: Normal rate and regular rhythm.    Pulmonary/Chest: Effort normal and breath sounds normal. No respiratory distress.   Neurological: He is alert.   Skin: Skin is warm and dry.   There is a 1 cm diameter fluctuant abscess on the medial aspect of the right arm.  It is surrounded by 6 cm diameter patch of erythema/cellulitis.  There is no active drainage.  There is no lymphangitis.  There is no axillary lymphadenopathy.  He has normal distal neurovascular function in the affected extremity   Psychiatric: He has a normal mood and affect. His behavior is normal.   Nursing note and vitals reviewed.           Incision and Drainage  Date/Time: 5/26/2019 4:21 PM  Performed by: STEPHAN GOLD  Authorized by: AUDREY YOUSIF     Consent:     Consent obtained:  Verbal    Consent given by:  Patient  Location:     Type:  Abscess    Location: right upper arm.  Pre-procedure details:     Skin preparation:  Chloraprep  Anesthesia (see MAR for exact dosages):     Anesthesia method:  Local infiltration    Local anesthetic:  Lidocaine 1% WITH epi  Procedure type:     Complexity:  Simple  Procedure details:     Incision types:  Stab incision    Incision depth:  Subcutaneous    Scalpel blade:  11    Wound management:  Probed and deloculated, irrigated with saline and extensive cleaning    Drainage:  Bloody and purulent    Drainage amount:  Scant    Packing materials:  1/4 in gauze  Post-procedure details:     Patient tolerance of procedure:  Tolerated well, no immediate complications        ED Course & MDM     Labs Reviewed - No data to display    No orders to display               [facility]         Clinical Impressions as of May 26 1914   Abscess        Stephan Gold PA C  05/26/19 1914

## 2019-05-26 NOTE — ED ATTESTATION NOTE
Abelardo Saeed was evaluated and managed by the physician assistant.  We discussed the plan of care, and I reviewed the relevant studies.       Jennifer Mata MD  05/26/19 1942

## 2019-05-26 NOTE — DISCHARGE INSTRUCTIONS
Pull the packing out in 2 days.  You may apply warm compresses and shower like normal.  Return to the emergency department if you have any worsening symptoms such as increasing pain, increasing redness, with the development of fevers and chills.

## 2022-07-10 ENCOUNTER — HOSPITAL ENCOUNTER (EMERGENCY)
Facility: HOSPITAL | Age: 22
Discharge: HOME | End: 2022-07-10
Attending: EMERGENCY MEDICINE
Payer: COMMERCIAL

## 2022-07-10 VITALS
TEMPERATURE: 97.8 F | OXYGEN SATURATION: 100 % | SYSTOLIC BLOOD PRESSURE: 135 MMHG | RESPIRATION RATE: 18 BRPM | HEART RATE: 69 BPM | DIASTOLIC BLOOD PRESSURE: 74 MMHG

## 2022-07-10 DIAGNOSIS — R10.32 LEFT LOWER QUADRANT ABDOMINAL PAIN: Primary | ICD-10-CM

## 2022-07-10 LAB
ANION GAP SERPL CALC-SCNC: 9 MEQ/L (ref 3–15)
BASOPHILS # BLD: 0.03 K/UL (ref 0.01–0.1)
BASOPHILS NFR BLD: 0.4 %
BUN SERPL-MCNC: 15 MG/DL (ref 8–20)
CALCIUM SERPL-MCNC: 9.4 MG/DL (ref 8.9–10.3)
CHLORIDE SERPL-SCNC: 104 MEQ/L (ref 98–109)
CO2 SERPL-SCNC: 25 MEQ/L (ref 22–32)
CREAT SERPL-MCNC: 1.1 MG/DL (ref 0.8–1.3)
DIFFERENTIAL METHOD BLD: ABNORMAL
EOSINOPHIL # BLD: 0.17 K/UL (ref 0.04–0.54)
EOSINOPHIL NFR BLD: 2.3 %
ERYTHROCYTE [DISTWIDTH] IN BLOOD BY AUTOMATED COUNT: 12 % (ref 11.6–14.4)
GFR SERPL CREATININE-BSD FRML MDRD: >60 ML/MIN/1.73M*2
GLUCOSE SERPL-MCNC: 104 MG/DL (ref 70–99)
HCT VFR BLDCO AUTO: 41.6 % (ref 40.1–51)
HGB BLD-MCNC: 14.2 G/DL (ref 13.7–17.5)
IMM GRANULOCYTES # BLD AUTO: 0.01 K/UL (ref 0–0.08)
IMM GRANULOCYTES NFR BLD AUTO: 0.1 %
LYMPHOCYTES # BLD: 3.47 K/UL (ref 1.2–3.5)
LYMPHOCYTES NFR BLD: 47.8 %
MCH RBC QN AUTO: 30.8 PG (ref 28–33.2)
MCHC RBC AUTO-ENTMCNC: 34.1 G/DL (ref 32.2–36.5)
MCV RBC AUTO: 90.2 FL (ref 83–98)
MONOCYTES # BLD: 0.45 K/UL (ref 0.3–1)
MONOCYTES NFR BLD: 6.2 %
NEUTROPHILS # BLD: 3.13 K/UL (ref 1.7–7)
NEUTS SEG NFR BLD: 43.2 %
NRBC BLD-RTO: 0 %
PDW BLD AUTO: 9 FL (ref 9.4–12.4)
PLATELET # BLD AUTO: 250 K/UL (ref 150–350)
POTASSIUM SERPL-SCNC: 3.3 MEQ/L (ref 3.6–5.1)
RBC # BLD AUTO: 4.61 M/UL (ref 4.5–5.8)
SODIUM SERPL-SCNC: 138 MEQ/L (ref 136–144)
WBC # BLD AUTO: 7.26 K/UL (ref 3.8–10.5)

## 2022-07-10 PROCEDURE — 63700000 HC SELF-ADMINISTRABLE DRUG

## 2022-07-10 PROCEDURE — 25800000 HC PHARMACY IV SOLUTIONS: Performed by: EMERGENCY MEDICINE

## 2022-07-10 PROCEDURE — 3E0337Z INTRODUCTION OF ELECTROLYTIC AND WATER BALANCE SUBSTANCE INTO PERIPHERAL VEIN, PERCUTANEOUS APPROACH: ICD-10-PCS | Performed by: EMERGENCY MEDICINE

## 2022-07-10 PROCEDURE — 96360 HYDRATION IV INFUSION INIT: CPT

## 2022-07-10 PROCEDURE — 85025 COMPLETE CBC W/AUTO DIFF WBC: CPT | Performed by: EMERGENCY MEDICINE

## 2022-07-10 PROCEDURE — 85025 COMPLETE CBC W/AUTO DIFF WBC: CPT

## 2022-07-10 PROCEDURE — 36415 COLL VENOUS BLD VENIPUNCTURE: CPT

## 2022-07-10 PROCEDURE — 80048 BASIC METABOLIC PNL TOTAL CA: CPT | Performed by: EMERGENCY MEDICINE

## 2022-07-10 PROCEDURE — 99284 EMERGENCY DEPT VISIT MOD MDM: CPT | Mod: 25

## 2022-07-10 RX ORDER — POTASSIUM CHLORIDE 750 MG/1
TABLET, EXTENDED RELEASE ORAL
Status: COMPLETED
Start: 2022-07-10 | End: 2022-07-10

## 2022-07-10 RX ORDER — POTASSIUM CHLORIDE 750 MG/1
40 TABLET, EXTENDED RELEASE ORAL 2 TIMES DAILY
Status: DISCONTINUED | OUTPATIENT
Start: 2022-07-10 | End: 2022-07-10 | Stop reason: HOSPADM

## 2022-07-10 RX ADMIN — POTASSIUM CHLORIDE 40 MEQ: 750 TABLET, EXTENDED RELEASE ORAL at 06:50

## 2022-07-10 RX ADMIN — SODIUM CHLORIDE 1000 ML: 9 INJECTION, SOLUTION INTRAVENOUS at 06:11

## 2022-07-10 ASSESSMENT — ENCOUNTER SYMPTOMS
HEMATURIA: 0
BACK PAIN: 0
VOMITING: 0
DYSURIA: 0
DIARRHEA: 1
FEVER: 0
ABDOMINAL PAIN: 1
NAUSEA: 1

## 2022-07-10 NOTE — DISCHARGE INSTRUCTIONS
Please follow-up with a primary care provider (PCP) regularly.  Please bring all your discharge paperwork with you to your follow up appointment. Your primary care physician will go over all of your results again including any incidental findings.  Your primary care provider can also help you implement the recommendations we gave you today, coordinate care among your specialist, as well as make sure you are up-to-date with wellness exams, immunizations, and preventive screenings.   Your PCP can also help when you are feeling sick, potentially avoiding the need for urgent care or emergency department visits.  For these reasons, it is important that you follow-up with your PCP at least annually or more often based upon your medical conditions.  If you do not have a PCP, please call [phone] ([phone]) for help finding one.

## 2022-07-10 NOTE — ED PROVIDER NOTES
Emergency Medicine Note  HPI   HISTORY OF PRESENT ILLNESS     21 year old male presents for eval  3 weeks of LLQ abdominal pain  Nausea no vomiting   Diarrhea  Daily bms  No testicle pain  No uti symptoms  No fever or chills  No recent surgery  No recent abx use  No sick contact       History provided by:  Patient  Abdominal Pain  Pain location:  LLQ  Pain severity:  Mild  Duration:  3 weeks  Timing:  Constant  Progression:  Unchanged  Relieved by:  None tried  Worsened by:  Nothing  Associated symptoms: diarrhea and nausea    Associated symptoms: no dysuria, no fever, no hematuria and no vomiting          Patient History   PAST HISTORY     Reviewed from Nursing Triage:         Past Medical History:   Diagnosis Date   • Gilbert's syndrome        History reviewed. No pertinent surgical history.    History reviewed. No pertinent family history.    Social History     Tobacco Use   • Smoking status: Current Some Day Smoker   • Smokeless tobacco: Never Used   Substance Use Topics   • Alcohol use: Not Currently   • Drug use: Yes     Types: Marijuana         Review of Systems   REVIEW OF SYSTEMS     Review of Systems   Constitutional: Negative for fever.   Gastrointestinal: Positive for abdominal pain, diarrhea and nausea. Negative for vomiting.   Genitourinary: Negative for dysuria, hematuria and testicular pain.   Musculoskeletal: Negative for back pain.         VITALS     ED Vitals    Date/Time Temp Pulse Resp BP SpO2 [facility]   07/10/22 0542 36.6 °C (97.8 °F) 104 18 150/94 98 % JRV        Pulse Ox %: 98 % (07/10/22 0601)  Pulse Ox Interpretation: Normal (07/10/22 0601)           Physical Exam   PHYSICAL EXAM     Physical Exam  Vitals reviewed.   Constitutional:       General: He is not in acute distress.     Appearance: He is not ill-appearing, toxic-appearing or diaphoretic.   Eyes:      General: No scleral icterus.  Cardiovascular:      Rate and Rhythm: Normal rate and regular rhythm.   Pulmonary:      Effort: Pulmonary  effort is normal.      Breath sounds: Normal breath sounds.   Abdominal:      General: Abdomen is flat.      Palpations: Abdomen is soft.      Tenderness: There is no abdominal tenderness. There is no right CVA tenderness, left CVA tenderness, guarding or rebound.   Genitourinary:     Testes: Normal. Cremasteric reflex is present.         Right: Tenderness or swelling not present.         Left: Tenderness or swelling not present.   Skin:     General: Skin is warm and dry.      Coloration: Skin is not jaundiced.   Neurological:      Mental Status: He is alert.   Psychiatric:         Mood and Affect: Mood normal.           PROCEDURES     Procedures     DATA     Results     Procedure Component Value Units Date/Time    Basic metabolic panel [[id number]]  (Abnormal) Collected: 07/10/22 0552    Specimen: Blood, Venous Updated: 07/10/22 0624     Sodium 138 mEQ/L      Potassium 3.3 mEQ/L      Comment: Results obtained on plasma. Plasma Potassium values may be up to 0.4 mEQ/L less than serum values. The differences may be greater for patients with high platelet or white cell counts.        Chloride 104 mEQ/L      CO2 25 mEQ/L      BUN 15 mg/dL      Creatinine 1.1 mg/dL      Glucose 104 mg/dL      Calcium 9.4 mg/dL      eGFR >60.0 mL/min/1.73m*2      Anion Gap 9 mEQ/L     CBC and differential [[id number]]  (Abnormal) Collected: 07/10/22 0552    Specimen: Blood, Venous Updated: 07/10/22 0604     WBC 7.26 K/uL      RBC 4.61 M/uL      Hemoglobin 14.2 g/dL      Hematocrit 41.6 %      MCV 90.2 fL      MCH 30.8 pg      MCHC 34.1 g/dL      RDW 12.0 %      Platelets 250 K/uL      MPV 9.0 fL      Differential Type Auto     nRBC 0.0 %      Immature Granulocytes 0.1 %      Neutrophils 43.2 %      Lymphocytes 47.8 %      Monocytes 6.2 %      Eosinophils 2.3 %      Basophils 0.4 %      Immature Granulocytes, Absolute 0.01 K/uL      Neutrophils, Absolute 3.13 K/uL      Lymphocytes, Absolute 3.47 K/uL      Monocytes, Absolute 0.45 K/uL       Eosinophils, Absolute 0.17 K/uL      Basophils, Absolute 0.03 K/uL           Imaging Results    None         No orders to display       Scoring tools                                 ED Course & MDM   MDM / ED COURSE / CLINICAL IMPRESSIONS / DISPO     MDM    ED Course as of 07/10/22 0634   Sun Jul 10, 2022   0628 Abd is non tender on my exam   3 weeks of pain  Labs physical reassuring  Fu with pcp and gi  Replete potassium   Pt was having some parathesias  Full strength x 4 extremities  Sensation intact  Likely 2nd to hypok+  K+ ordered and pt to eat high potassium foods    [JUSTINA]      ED Course User Index  [JUSTINA] Sunny Jenkins,          Clinical Impressions as of 07/10/22 0634   Left lower quadrant abdominal pain     Discharge         Sunny Jenkins,   07/10/22 0629       Sunny Jenkins,   07/10/22 0634

## 2022-11-11 ENCOUNTER — HOSPITAL ENCOUNTER (EMERGENCY)
Facility: HOSPITAL | Age: 22
Discharge: HOME | End: 2022-11-11
Attending: EMERGENCY MEDICINE
Payer: COMMERCIAL

## 2022-11-11 ENCOUNTER — APPOINTMENT (EMERGENCY)
Dept: RADIOLOGY | Facility: HOSPITAL | Age: 22
End: 2022-11-11
Payer: COMMERCIAL

## 2022-11-11 VITALS
HEIGHT: 72 IN | HEART RATE: 79 BPM | BODY MASS INDEX: 17.61 KG/M2 | TEMPERATURE: 97.7 F | WEIGHT: 130 LBS | OXYGEN SATURATION: 97 % | SYSTOLIC BLOOD PRESSURE: 123 MMHG | DIASTOLIC BLOOD PRESSURE: 86 MMHG | RESPIRATION RATE: 16 BRPM

## 2022-11-11 DIAGNOSIS — R07.9 ACUTE CHEST PAIN: Primary | ICD-10-CM

## 2022-11-11 LAB
ATRIAL RATE: 70
P AXIS: 75
PR INTERVAL: 132
QRS DURATION: 108
QT INTERVAL: 364
QTC CALCULATION(BAZETT): 393
R AXIS: 88
T WAVE AXIS: 72
VENTRICULAR RATE: 70

## 2022-11-11 PROCEDURE — 93010 ELECTROCARDIOGRAM REPORT: CPT | Performed by: INTERNAL MEDICINE

## 2022-11-11 PROCEDURE — 99283 EMERGENCY DEPT VISIT LOW MDM: CPT | Mod: 25

## 2022-11-11 PROCEDURE — 71046 X-RAY EXAM CHEST 2 VIEWS: CPT

## 2022-11-11 PROCEDURE — 93005 ELECTROCARDIOGRAM TRACING: CPT | Performed by: EMERGENCY MEDICINE

## 2022-11-11 PROCEDURE — 93005 ELECTROCARDIOGRAM TRACING: CPT

## 2022-11-11 RX ORDER — OXYCODONE HYDROCHLORIDE 5 MG/1
5 TABLET ORAL EVERY 4 HOURS PRN
COMMUNITY

## 2022-11-11 ASSESSMENT — ENCOUNTER SYMPTOMS
FEVER: 0
SHORTNESS OF BREATH: 1
CHILLS: 0
DIZZINESS: 0
PALPITATIONS: 0
LIGHT-HEADEDNESS: 0
CHEST TIGHTNESS: 1
COUGH: 0

## 2022-11-11 NOTE — ED ATTESTATION NOTE
I have personally seen and examined the patient.  I reviewed and agree with physician assistant / nurse practitioners assessment and plan of care    My examination, assessment, and plan of care of  is as follows:     Patient presents with chest pain that started while he was vaping and eating pizza around 12 midnight.  It is mostly resolved at this time no nausea no vomiting no real shortness of breath pain is nonpleuritic.  Patient did state he was having some mild sore throat.    Exam  Patient awake alert oriented no acute distress  Lungs are clear bilateral auscultation no wheezing no rhonchi no rales  Heart regular rate and rhythm no murmurs  Skin warm and dry  Posterior pharynx is clear normal tonsils normal uvula    Plan  Patient's EKG was unremarkable nonischemic, chest x-ray unremarkable no no pneumothorax nor pneumomediastinum         Ernesto Sosa MD  11/11/22 7437

## 2022-11-11 NOTE — DISCHARGE INSTRUCTIONS
Your EKG and chest x-ray were unremarkable.  Your symptoms have mostly resolved please return for any new or worse symptoms, follow-up with your primary doctor in the next few days.

## 2022-11-11 NOTE — ED PROVIDER NOTES
Emergency Medicine Note  HPI   HISTORY OF PRESENT ILLNESS     21 year old male presents to the ER for evaluation of chest tightness. Pt says that around midnight he had just finished a pizza and started to feel tightness in his chest and up into his throat. He started to belch a lot of as well. He says that it was persistent up until a couple of hours ago and now it is just intermittent. He denies any h/o similar symptoms. No associated abdominal pain, dizziness, diaphoresis. He did feel a little bit short of breath at as well.  He did not have any throat swelling, rash, itching, nausea or vomiting             Patient History   PAST HISTORY     Reviewed from Nursing Triage:       Past Medical History:   Diagnosis Date    Gilbert's syndrome        History reviewed. No pertinent surgical history.    History reviewed. No pertinent family history.    Social History     Tobacco Use    Smoking status: Some Days    Smokeless tobacco: Never   Substance Use Topics    Alcohol use: Not Currently    Drug use: Yes     Types: Marijuana         Review of Systems   REVIEW OF SYSTEMS     Review of Systems   Constitutional: Negative for chills and fever.   HENT: Negative for congestion.         Throat tightness    Respiratory: Positive for chest tightness and shortness of breath. Negative for cough.    Cardiovascular: Negative for chest pain, palpitations and leg swelling.   Skin: Negative for rash.   Neurological: Negative for dizziness and light-headedness.         VITALS     ED Vitals    Date/Time Temp Pulse Resp BP SpO2 [facility]   11/11/22 0314 36.5 °C (97.7 °F) 79 16 123/86 97 % VAHID        Pulse Ox %: 97 % (11/11/22 0520)  Pulse Ox Interpretation: Normal (11/11/22 0520)  Heart Rate: 79 (11/11/22 0520)  Rhythm Strip Interpretation: Normal Sinus Rhythm (11/11/22 0520)     Physical Exam   PHYSICAL EXAM     Physical Exam  Constitutional:       General: He is not in acute distress.     Appearance: He is well-developed. He is not  ill-appearing, toxic-appearing or diaphoretic.   HENT:      Mouth/Throat:      Comments: No throat swelling   Cardiovascular:      Rate and Rhythm: Normal rate and regular rhythm.   Pulmonary:      Effort: Pulmonary effort is normal.      Breath sounds: Normal breath sounds. No decreased breath sounds, wheezing, rhonchi or rales.   Musculoskeletal:      Cervical back: Neck supple.   Skin:     General: Skin is warm and dry.   Neurological:      Mental Status: He is alert.           PROCEDURES     Procedures     DATA     Results     None          Imaging Results          X-RAY CHEST 2 VIEWS (Preliminary result)  Result time 11/11/22 05:19:09   Procedure changed from X-RAY CHEST 1 VIEW     ED Interpretation    NAD                              ECG 12 lead   ED Interpretation   EKG shows a normal sinus rhythm at a rate of 70 bpm with a normal axis normal ST segments normal T waves early repole          Scoring tools                                  ED Course & MDM   MDM / ED COURSE / CLINICAL IMPRESSION / DISPO     MDM    ED Course as of 11/11/22 0608   Fri Nov 11, 2022   0426 X-RAY CHEST 2 VIEWS [AC]      ED Course User Index  [AC] Maryjane Charles PA C     Clinical Impression      Acute chest pain     _________________     ED Disposition   Discharge                   Maryjane Charles PA C  11/11/22 0608

## 2023-09-25 ENCOUNTER — PATIENT MESSAGE (OUTPATIENT)
Dept: ADMINISTRATIVE | Facility: HOSPITAL | Age: 23
End: 2023-09-25
Payer: COMMERCIAL